# Patient Record
Sex: MALE | Race: WHITE | NOT HISPANIC OR LATINO | URBAN - METROPOLITAN AREA
[De-identification: names, ages, dates, MRNs, and addresses within clinical notes are randomized per-mention and may not be internally consistent; named-entity substitution may affect disease eponyms.]

---

## 2022-04-22 ENCOUNTER — EMERGENCY (EMERGENCY)
Facility: HOSPITAL | Age: 32
LOS: 1 days | Discharge: ANOTHER HOSPITAL | End: 2022-04-22
Attending: EMERGENCY MEDICINE | Admitting: EMERGENCY MEDICINE
Payer: COMMERCIAL

## 2022-04-22 ENCOUNTER — EMERGENCY (EMERGENCY)
Facility: HOSPITAL | Age: 32
LOS: 1 days | Discharge: HOME | End: 2022-04-22
Attending: EMERGENCY MEDICINE | Admitting: EMERGENCY MEDICINE
Payer: COMMERCIAL

## 2022-04-22 VITALS
DIASTOLIC BLOOD PRESSURE: 82 MMHG | RESPIRATION RATE: 17 BRPM | HEART RATE: 65 BPM | OXYGEN SATURATION: 99 % | SYSTOLIC BLOOD PRESSURE: 143 MMHG

## 2022-04-22 VITALS
OXYGEN SATURATION: 99 % | HEART RATE: 60 BPM | SYSTOLIC BLOOD PRESSURE: 152 MMHG | RESPIRATION RATE: 20 BRPM | DIASTOLIC BLOOD PRESSURE: 81 MMHG

## 2022-04-22 VITALS
HEART RATE: 91 BPM | OXYGEN SATURATION: 100 % | DIASTOLIC BLOOD PRESSURE: 85 MMHG | TEMPERATURE: 98 F | RESPIRATION RATE: 18 BRPM | SYSTOLIC BLOOD PRESSURE: 144 MMHG

## 2022-04-22 VITALS
HEART RATE: 66 BPM | HEIGHT: 76 IN | SYSTOLIC BLOOD PRESSURE: 138 MMHG | WEIGHT: 179.9 LBS | DIASTOLIC BLOOD PRESSURE: 86 MMHG | RESPIRATION RATE: 16 BRPM | TEMPERATURE: 98 F | OXYGEN SATURATION: 99 %

## 2022-04-22 DIAGNOSIS — J02.9 ACUTE PHARYNGITIS, UNSPECIFIED: ICD-10-CM

## 2022-04-22 DIAGNOSIS — Z20.822 CONTACT WITH AND (SUSPECTED) EXPOSURE TO COVID-19: ICD-10-CM

## 2022-04-22 DIAGNOSIS — J36 PERITONSILLAR ABSCESS: ICD-10-CM

## 2022-04-22 LAB
ALBUMIN SERPL ELPH-MCNC: 3.8 G/DL — SIGNIFICANT CHANGE UP (ref 3.4–5)
ALP SERPL-CCNC: 60 U/L — SIGNIFICANT CHANGE UP (ref 40–120)
ALT FLD-CCNC: 40 U/L — SIGNIFICANT CHANGE UP (ref 12–42)
ANION GAP SERPL CALC-SCNC: 7 MMOL/L — LOW (ref 9–16)
APPEARANCE UR: CLEAR — SIGNIFICANT CHANGE UP
APTT BLD: 27.9 SEC — SIGNIFICANT CHANGE UP (ref 27.5–35.5)
AST SERPL-CCNC: 14 U/L — LOW (ref 15–37)
BASOPHILS # BLD AUTO: 0.02 K/UL — SIGNIFICANT CHANGE UP (ref 0–0.2)
BASOPHILS NFR BLD AUTO: 0.1 % — SIGNIFICANT CHANGE UP (ref 0–2)
BILIRUB SERPL-MCNC: 0.5 MG/DL — SIGNIFICANT CHANGE UP (ref 0.2–1.2)
BILIRUB UR-MCNC: NEGATIVE — SIGNIFICANT CHANGE UP
BUN SERPL-MCNC: 12 MG/DL — SIGNIFICANT CHANGE UP (ref 7–23)
CALCIUM SERPL-MCNC: 9.3 MG/DL — SIGNIFICANT CHANGE UP (ref 8.5–10.5)
CHLORIDE SERPL-SCNC: 101 MMOL/L — SIGNIFICANT CHANGE UP (ref 96–108)
CO2 SERPL-SCNC: 30 MMOL/L — SIGNIFICANT CHANGE UP (ref 22–31)
COLOR SPEC: YELLOW — SIGNIFICANT CHANGE UP
CREAT SERPL-MCNC: 0.94 MG/DL — SIGNIFICANT CHANGE UP (ref 0.5–1.3)
DIFF PNL FLD: NEGATIVE — SIGNIFICANT CHANGE UP
EGFR: 110 ML/MIN/1.73M2 — SIGNIFICANT CHANGE UP
EOSINOPHIL # BLD AUTO: 0.02 K/UL — SIGNIFICANT CHANGE UP (ref 0–0.5)
EOSINOPHIL NFR BLD AUTO: 0.1 % — SIGNIFICANT CHANGE UP (ref 0–6)
GLUCOSE SERPL-MCNC: 93 MG/DL — SIGNIFICANT CHANGE UP (ref 70–99)
GLUCOSE UR QL: NEGATIVE — SIGNIFICANT CHANGE UP
GRAM STN FLD: SIGNIFICANT CHANGE UP
HCT VFR BLD CALC: 41.7 % — SIGNIFICANT CHANGE UP (ref 39–50)
HGB BLD-MCNC: 14 G/DL — SIGNIFICANT CHANGE UP (ref 13–17)
IMM GRANULOCYTES NFR BLD AUTO: 0.7 % — SIGNIFICANT CHANGE UP (ref 0–1.5)
INR BLD: 1.08 — SIGNIFICANT CHANGE UP (ref 0.88–1.16)
KETONES UR-MCNC: NEGATIVE — SIGNIFICANT CHANGE UP
LACTATE SERPL-SCNC: 1.4 MMOL/L — SIGNIFICANT CHANGE UP (ref 0.4–2)
LEUKOCYTE ESTERASE UR-ACNC: NEGATIVE — SIGNIFICANT CHANGE UP
LYMPHOCYTES # BLD AUTO: 1.26 K/UL — SIGNIFICANT CHANGE UP (ref 1–3.3)
LYMPHOCYTES # BLD AUTO: 8.3 % — LOW (ref 13–44)
MANUAL SMEAR VERIFICATION: SIGNIFICANT CHANGE UP
MCHC RBC-ENTMCNC: 30.1 PG — SIGNIFICANT CHANGE UP (ref 27–34)
MCHC RBC-ENTMCNC: 33.6 GM/DL — SIGNIFICANT CHANGE UP (ref 32–36)
MCV RBC AUTO: 89.7 FL — SIGNIFICANT CHANGE UP (ref 80–100)
MONOCYTES # BLD AUTO: 1.65 K/UL — HIGH (ref 0–0.9)
MONOCYTES NFR BLD AUTO: 10.8 % — SIGNIFICANT CHANGE UP (ref 2–14)
NEUTROPHILS # BLD AUTO: 12.19 K/UL — HIGH (ref 1.8–7.4)
NEUTROPHILS NFR BLD AUTO: 80 % — HIGH (ref 43–77)
NITRITE UR-MCNC: NEGATIVE — SIGNIFICANT CHANGE UP
NRBC # BLD: 0 /100 WBCS — SIGNIFICANT CHANGE UP (ref 0–0)
PH UR: 7 — SIGNIFICANT CHANGE UP (ref 5–8)
PLAT MORPH BLD: NORMAL — SIGNIFICANT CHANGE UP
PLATELET # BLD AUTO: 333 K/UL — SIGNIFICANT CHANGE UP (ref 150–400)
POTASSIUM SERPL-MCNC: 3.9 MMOL/L — SIGNIFICANT CHANGE UP (ref 3.5–5.3)
POTASSIUM SERPL-SCNC: 3.9 MMOL/L — SIGNIFICANT CHANGE UP (ref 3.5–5.3)
PROT SERPL-MCNC: 7.9 G/DL — SIGNIFICANT CHANGE UP (ref 6.4–8.2)
PROT UR-MCNC: NEGATIVE MG/DL — SIGNIFICANT CHANGE UP
PROTHROM AB SERPL-ACNC: 12.7 SEC — SIGNIFICANT CHANGE UP (ref 10.5–13.4)
RAPID RVP RESULT: SIGNIFICANT CHANGE UP
RBC # BLD: 4.65 M/UL — SIGNIFICANT CHANGE UP (ref 4.2–5.8)
RBC # FLD: 12.3 % — SIGNIFICANT CHANGE UP (ref 10.3–14.5)
RBC BLD AUTO: NORMAL — SIGNIFICANT CHANGE UP
S PYO AG SPEC QL IA: NEGATIVE — SIGNIFICANT CHANGE UP
SARS-COV-2 RNA SPEC QL NAA+PROBE: SIGNIFICANT CHANGE UP
SODIUM SERPL-SCNC: 138 MMOL/L — SIGNIFICANT CHANGE UP (ref 132–145)
SP GR SPEC: 1.01 — SIGNIFICANT CHANGE UP (ref 1–1.03)
SPECIMEN SOURCE: SIGNIFICANT CHANGE UP
UROBILINOGEN FLD QL: 0.2 E.U./DL — SIGNIFICANT CHANGE UP
WBC # BLD: 15.24 K/UL — HIGH (ref 3.8–10.5)
WBC # FLD AUTO: 15.24 K/UL — HIGH (ref 3.8–10.5)

## 2022-04-22 PROCEDURE — 99284 EMERGENCY DEPT VISIT MOD MDM: CPT | Mod: 25

## 2022-04-22 PROCEDURE — 99284 EMERGENCY DEPT VISIT MOD MDM: CPT

## 2022-04-22 PROCEDURE — 71046 X-RAY EXAM CHEST 2 VIEWS: CPT | Mod: 26

## 2022-04-22 PROCEDURE — 87070 CULTURE OTHR SPECIMN AEROBIC: CPT

## 2022-04-22 PROCEDURE — 42700 I&D ABSCESS PERITONSILLAR: CPT

## 2022-04-22 PROCEDURE — 70491 CT SOFT TISSUE NECK W/DYE: CPT | Mod: 26

## 2022-04-22 PROCEDURE — 93010 ELECTROCARDIOGRAM REPORT: CPT

## 2022-04-22 RX ORDER — DEXAMETHASONE 0.5 MG/5ML
10 ELIXIR ORAL ONCE
Refills: 0 | Status: COMPLETED | OUTPATIENT
Start: 2022-04-22 | End: 2022-04-22

## 2022-04-22 RX ORDER — PIPERACILLIN AND TAZOBACTAM 4; .5 G/20ML; G/20ML
3.38 INJECTION, POWDER, LYOPHILIZED, FOR SOLUTION INTRAVENOUS ONCE
Refills: 0 | Status: COMPLETED | OUTPATIENT
Start: 2022-04-22 | End: 2022-04-22

## 2022-04-22 RX ORDER — SODIUM CHLORIDE 9 MG/ML
2500 INJECTION INTRAMUSCULAR; INTRAVENOUS; SUBCUTANEOUS ONCE
Refills: 0 | Status: COMPLETED | OUTPATIENT
Start: 2022-04-22 | End: 2022-04-22

## 2022-04-22 RX ORDER — TETRACAINE/BENZOCAINE/BUTAMBEN 2%-14%-2%
1 OINTMENT (GRAM) TOPICAL ONCE
Refills: 0 | Status: COMPLETED | OUTPATIENT
Start: 2022-04-22 | End: 2022-04-22

## 2022-04-22 RX ORDER — KETOROLAC TROMETHAMINE 30 MG/ML
15 SYRINGE (ML) INJECTION ONCE
Refills: 0 | Status: DISCONTINUED | OUTPATIENT
Start: 2022-04-22 | End: 2022-04-22

## 2022-04-22 RX ADMIN — Medication 15 MILLIGRAM(S): at 12:14

## 2022-04-22 RX ADMIN — Medication 102 MILLIGRAM(S): at 11:42

## 2022-04-22 RX ADMIN — Medication 15 MILLIGRAM(S): at 11:42

## 2022-04-22 RX ADMIN — PIPERACILLIN AND TAZOBACTAM 200 GRAM(S): 4; .5 INJECTION, POWDER, LYOPHILIZED, FOR SOLUTION INTRAVENOUS at 11:42

## 2022-04-22 RX ADMIN — SODIUM CHLORIDE 2500 MILLILITER(S): 9 INJECTION INTRAMUSCULAR; INTRAVENOUS; SUBCUTANEOUS at 11:42

## 2022-04-22 RX ADMIN — Medication 1 SPRAY(S): at 15:41

## 2022-04-22 NOTE — ED ADULT TRIAGE NOTE - CHIEF COMPLAINT QUOTE
transfer from [facility] for peritonsillar abscess. pt denies sob, n/v, currently pain is under control and afebrile

## 2022-04-22 NOTE — PROCEDURE NOTE - NSFINDINGS_GEN_A_CORE
Attending MD Toussaint.  Pt seen and examined by me on morning of 4/22 in CDU obs unit.  Pt is a 78 yo male with pmhx of PTSD, GERD, prediabetes, recovering alcoholic/opiate addict, EMI, SBP who presented to ED with complaint of R knee swelling, redness x 2 wks.  Sxs have worsened and presented with inc erythema, warmth.  XR in ED c/w arthritis of knee.  Sent to CDU for cellulitis receiving anceph.  Sxs have not worsened.  Lyme titers sent this morning.  Pt has preserved ROM of knee, improving since arrival.  Pt has received 2 doses anceph.  Planned next dose currently and probable transition to PO abxs after this morning's dose.  Planned referral for endocrine f/u 2/2 new DM dx (Hgb A1c 6.5).
3 cc/purulent drainage

## 2022-04-22 NOTE — ED PROVIDER NOTE - PROGRESS NOTE DETAILS
d/w with ENT resident Kaiden who agrees with ED to ED transfer for ENT evaluation. requests repeat CT  prior to transfer.

## 2022-04-22 NOTE — ED PROVIDER NOTE - CARE PROVIDER_API CALL
Meek James)  Otolaryngology  [address], [address], [place] [address]  Phone: [phone]  Fax: [phone]  Follow Up Time:

## 2022-04-22 NOTE — ED PROVIDER NOTE - NS ED ATTENDING STATEMENT MOD
This was a shared visit with the JAYLON. I reviewed and verified the documentation and independently performed the documented:

## 2022-04-22 NOTE — ED PROVIDER NOTE - CARE PROVIDERS DIRECT ADDRESSES
[email or web address]

## 2022-04-22 NOTE — ED PROVIDER NOTE - PHYSICAL EXAMINATION
VITAL SIGNS: I have reviewed nursing notes and confirm.  CONSTITUTIONAL: Well appearing, in no acute distress.   SKIN:  warm and dry, no acute rash.   HEAD:  normocephalic, atraumatic.  EYES: EOM intact; conjunctiva and sclera clear.  ENT: No nasal discharge; airway clear. Erythema to left tonsil. Left peritonsillar abscess. Uvula deviated to the right.   NECK: Supple; non tender.  CARD: S1, S2 normal; no murmurs, gallops, or rubs. Regular rate and rhythm.   RESP:  Clear to auscultation b/l, no wheezes, rales or rhonchi.  ABD: Normal bowel sounds; soft; non-distended; non-tender; no guarding/ rebound.  EXT: Normal ROM. No clubbing, cyanosis or edema. 2+ pulses to b/l ue/le.  NEURO: Alert, oriented, grossly unremarkable  PSYCH: Cooperative, mood and affect appropriate. VITAL SIGNS: I have reviewed nursing notes and confirm.  CONSTITUTIONAL: Well appearing, in no acute distress. + muffled voice.  SKIN:  warm and dry, no acute rash.   HEAD:  normocephalic, atraumatic.  EYES: EOM intact; conjunctiva and sclera clear.  ENT: No nasal discharge; airway clear. + Left peritonsillar abscess with erythema, Uvula deviated to the right.   NECK: Supple; non tender.  CARD: S1, S2 normal; no murmurs, gallops, or rubs. Regular rate and rhythm.   RESP:  Clear to auscultation b/l, no wheezes, rales or rhonchi.  ABD: Normal bowel sounds; soft; non-distended; non-tender; no guarding/ rebound.  EXT: Normal ROM. No clubbing, cyanosis or edema. 2+ pulses to b/l ue/le.  NEURO: Alert, oriented, grossly unremarkable  PSYCH: Cooperative, mood and affect appropriate.

## 2022-04-22 NOTE — ED PROVIDER NOTE - OBJECTIVE STATEMENT
33 y/o M transferred from [facility] for left peritonsillar abscess noted on CT neck today presents to ED with c/o sore throat that began 8 days ago. Pain is left sided and has progressively worsened. Pt also with c/o difficulty swallowing and difficulty speaking. Denies any SOB, chest pain, abdominal pain, fevers, or chills. Pt was initially started on amoxicillin and steroids with transient improvement. Pt was seen by ENT 3 days ago. I&D was performed with small amount of drainage. Antibiotics were changed to clindamycin and pt started on medrol dose pack with transient improvement. Pt presented to [facility] due to worsening pain. While at [facility] pt was treated with zosyn, toradol, and dexamethasone. 33 y/o M transferred from [facility] for left peritonsillar abscess noted on CT neck today. + left sided sore throat that began 8 days ago and has gotten progressively worse. Pt c/o difficulty swallowing and difficulty speaking. Denies any SOB, chest pain, abdominal pain, fevers, or chills. Pt was initially started on amoxicillin and steroids with transient improvement. Pt was seen by ENT 3 days ago and I&D performed with small amount of drainage. Antibiotics were changed to clindamycin and pt started on medrol dose pack with transient improvement. Pt presented to [facility] today due to worsening pain. While at [facility] pt was treated with zosyn, toradol, and dexamethasone.

## 2022-04-22 NOTE — ED PROVIDER NOTE - CLINICAL SUMMARY MEDICAL DECISION MAKING FREE TEXT BOX
pt presents with left PTA. failed outpatient abx x 2 (amoxicillin and clindamycin) and steroids and prior I&D. difficult phonation but protecting airway. labs with leukocytosis. will repeat CT at ENT request. will transfer for ENT consult.

## 2022-04-22 NOTE — ED PROVIDER NOTE - ENMT, MLM
Airway patent, Nasal mucosa clear. Mouth with normal mucosa. throat with left sided tonsilar edema and exudates, erythematous, uvula deviated to the right, voice muffled

## 2022-04-22 NOTE — ED ADULT NURSE NOTE - CHIEF COMPLAINT QUOTE
pt dx left pta on 4/14/22 presents for increased pain and difficulty swallowing/clearing secretions; o2 sat 100 on room air, afebrile

## 2022-04-22 NOTE — ED ADULT NURSE NOTE - OBJECTIVE STATEMENT
pt presents to ed for peritonsillar abscess from stand alone clinic. pt states he has been having throat pain for 8 days. states he mainly has pain when he swallows. pt was on abt for the abscess but states it is not getting better. denies any trouble breathing.

## 2022-04-22 NOTE — CONSULT NOTE ADULT - ATTENDING COMMENTS
I agree with hx, exam, as outlined.  I also agree with treatment plan and this was discussed in detail.    Pt doing well post I and D.  Will follow up with me 1 week    Meek James

## 2022-04-22 NOTE — CONSULT NOTE ADULT - SUBJECTIVE AND OBJECTIVE BOX
ENT Consult Note    HPI:      PAST MEDICAL & SURGICAL HISTORY:  No pertinent past medical history        MEDICATIONS  (STANDING):    MEDICATIONS  (PRN):      Social History:      Vital Signs Last 24 Hrs  T(C): 36.8 (22 Apr 2022 14:55), Max: 36.8 (22 Apr 2022 14:55)  T(F): 98.2 (22 Apr 2022 14:55), Max: 98.2 (22 Apr 2022 14:55)  HR: 66 (22 Apr 2022 14:55) (60 - 91)  BP: 138/86 (22 Apr 2022 14:55) (129/73 - 152/81)  BP(mean): --  RR: 16 (22 Apr 2022 14:55) (16 - 20)  SpO2: 99% (22 Apr 2022 14:55) (98% - 100%)    Physical Exam:                                14.0   15.24 )-----------( 333      ( 22 Apr 2022 11:20 )             41.7       04-22    138  |  101  |  12  ----------------------------<  93  3.9   |  30  |  0.94    Ca    9.3      22 Apr 2022 11:20    TPro  7.9  /  Alb  3.8  /  TBili  0.5  /  DBili  x   /  AST  14<L>  /  ALT  40  /  AlkPhos  60  04-22      PT/INR - ( 22 Apr 2022 11:20 )   PT: 12.7 sec;   INR: 1.08          PTT - ( 22 Apr 2022 11:20 )  PTT:27.9 sec       ENT Consult Note    HPI: 32M no PMH presenting with sore throat starting 10 days ago. 1 week ago woke up with significantly more pain and went to PCP who prescribed amoxicillin and sterois. Pt had minimal improvement but symptoms began to worsen by day 3 of taking abx. Pt went to ENT to attempted I&D and switched antibiotic to clindamycin and gave medrol. Pat again experienced minimal relief and symptoms began to worsen. He then presented to [facility]. CT showed a left peritonsillar abscess. He was given zosyn and decadron in the ED. He was transferred to [facility] ED for ENT consult. He has not had any fevers, has been tolerating liquid diet. Has never had a PTA before.       PAST MEDICAL & SURGICAL HISTORY:  No pertinent past medical history        MEDICATIONS  (STANDING):    MEDICATIONS  (PRN):      Social History:      Vital Signs Last 24 Hrs  T(C): 36.8 (22 Apr 2022 14:55), Max: 36.8 (22 Apr 2022 14:55)  T(F): 98.2 (22 Apr 2022 14:55), Max: 98.2 (22 Apr 2022 14:55)  HR: 66 (22 Apr 2022 14:55) (60 - 91)  BP: 138/86 (22 Apr 2022 14:55) (129/73 - 152/81)  BP(mean): --  RR: 16 (22 Apr 2022 14:55) (16 - 20)  SpO2: 99% (22 Apr 2022 14:55) (98% - 100%)    Physical Exam:  Gen: NAD  OC/OP: left peritonsillar swelling, tonsillar exudate, mild trismus, uvula deviated to the right. Floor of mouth soft  Resp: Breathing comfortably on RA, muffled voice                          14.0   15.24 )-----------( 333      ( 22 Apr 2022 11:20 )             41.7       04-22    138  |  101  |  12  ----------------------------<  93  3.9   |  30  |  0.94    Ca    9.3      22 Apr 2022 11:20    TPro  7.9  /  Alb  3.8  /  TBili  0.5  /  DBili  x   /  AST  14<L>  /  ALT  40  /  AlkPhos  60  04-22      PT/INR - ( 22 Apr 2022 11:20 )   PT: 12.7 sec;   INR: 1.08          PTT - ( 22 Apr 2022 11:20 )  PTT:27.9 sec    See procedure note for I&D

## 2022-04-22 NOTE — ED PROVIDER NOTE - ATTENDING APP SHARED VISIT CONTRIBUTION OF CARE
left PTA confirmed by imaging yesterday, presents for worsening pain, dysphonia since being seen by ENT at [facility] yesterday. Pt has been on both augmentin/clindamycin.    sats wnl, no stridor/tripodding/drooling. + hot potato voice.    plan: consult ENT [facility] .

## 2022-04-22 NOTE — ED ADULT NURSE NOTE - OBJECTIVE STATEMENT
Pt presents to ED complaining of dx'd PTA to L side that has been unresponsive to PO abx treatment. Pt endorses seeing GP, ENT, and rcving CT neck yesterday at [facility]. Pt unable to speak, tolerate solid foods, or fully open mouth. Pt appears well perfused. Skin intact with pruritic rash to medial aspect of thighs and inner arms sp abx treatment.  Afebrile at time of assessment denies NVD, chills.

## 2022-04-22 NOTE — ED PROVIDER NOTE - SKIN, MLM
Skin normal color for race, warm, dry and intact. + erythematous maculopapular rash to bilat arms and chest

## 2022-04-22 NOTE — CONSULT NOTE ADULT - ASSESSMENT
32M with 10 days of throat pain, failed course of antibiotics, presenting with peritonsillar abscess. S/p bedside I&D (See procedure note for details).    - f/u cultures  - augmentin x1 week  - medrol dosepack  - Follow up with Dr. James in 1 week

## 2022-04-22 NOTE — ED ADULT NURSE NOTE - SUICIDE SCREENING QUESTION 3
Received a call from Paz Fan RN from [facility], who wanted an update for accepting pulmonologist Dr. Daiana Jimenez. Confirmed that patient is not awaiting beds from other hospitals. No bed available at [facility] at this time.    No

## 2022-04-22 NOTE — ED PROVIDER NOTE - PATIENT PORTAL LINK FT
You can access the FollowMyHealth Patient Portal offered by [facility] by registering at the following website: [email or web address]. By joining [email or web address] FollowMyHealth portal, you will also be able to view your health information using other applications (apps) compatible with our system.

## 2022-04-22 NOTE — ED PROVIDER NOTE - OBJECTIVE STATEMENT
31yo otherwise healthy M presents with c/o left sided sore throat x7-10 days. pt seen by PMD who prescribed amoxicillin and steroids which provided some relief x 3 days but then symptoms worsened so he was referred to ENT who he saw 3 days ago and had attempted I&D without much improvement. was switched to clindamycin and medrol dose pack which he has been taking x2 days without relief. had CT neck done yesterday which 3x3cm phlegmon. also notes developed a rash. denies fever, chills, sob, cp, dizziness. notes pain with swallowing and with speaking. had negative strep and negative culture, no mono done. significantly muffled voice so girlfriend assisted with history.

## 2022-04-22 NOTE — ED PROVIDER NOTE - CLINICAL SUMMARY MEDICAL DECISION MAKING FREE TEXT BOX
Impression: left sided PTA confirmed on CT maxillofacial. ENT consulted for I&D of abscess. Impression: left sided PTA confirmed on CT neck at [facility], here for ent evaluation. AVSS. Pt seen by ENT and i&d performed with drainage of pus. Sx's improved afterwards and pt speaking more clearly. Pt cleared for dc home with outpt f/u with Dr. James in 1 wk. Will change abx to augmentin and given new rx for medrol dose pack. ED evaluation and management discussed with the patient in detail. Strict ED return instructions discussed in detail and patient given the opportunity to ask any questions about their discharge diagnosis and instructions. Patient verbalized understanding.

## 2022-04-22 NOTE — ED PROVIDER NOTE - NSFOLLOWUPINSTRUCTIONS_ED_ALL_ED_FT
Take augmentin twice daily for the following week.  Also take medrol dose pack starting tomorrow for 1 week.  Drink plenty of fluids, take advil/ motrin as needed for pain.  Follow up with Dr. James next Thursday/ Friday for re-evaluation.  Return to er for any new or worsening symptoms (fever, chills, increased pain, difficulty breathing, inability to swallow secretions...).      EnglishSpanish                                                                         Peritonsillar Abscess    An open mouth showing the tonsils.   A peritonsillar abscess is an infected area in your throat that is filled with pus. It forms behind your tonsils. This may be treated by:  •Draining the pus. Your doctor may do this with a syringe and a needle (needle aspiration) or by making a cut in the abscess.      •Using antibiotic medicine.        Follow these instructions at home:    Medicines     •Take over-the-counter and prescription medicines only as told by your doctor.      •If you were prescribed an antibiotic, take it as told by your doctor. Do not stop taking the antibiotic even if you start to feel better.        Eating and drinking   A diet of soft foods, including apple sauce, yogurt, and smoothie.    •Drink enough fluid to keep your pee (urine) pale yellow.    •While your throat is sore, try one of these:  •Only drinking liquids.      •Eating only soft foods, such as yogurt and ice cream.        General instructions     •Rest as much as you can. Get plenty of sleep.      •Return to your normal activities as told by your doctor. Ask your doctor what activities are safe for you.    •If your abscess was drained, gargle with a salt-water mixture 3–4 times a day or as needed.  • To make a salt-water mixture, completely dissolve ½–1 tsp of salt in 1 cup of warm water.       •Do not swallow this mixture.        • Do not use any products that have nicotine or tobacco in them. These include cigarettes and e-cigarettes. If you need help quitting, ask your doctor.      •Keep all follow-up visits as told by your doctor. This is important.        Contact a doctor if you have:    •More pain, swelling, redness, or pus in your throat.      •A headache.      •Low energy (lethargy).      •A general feeling of illness (malaise).      •A fever.      •Dizziness.      •Trouble swallowing.      •Trouble eating.    •Signs of body fluid loss (dehydration), such as:  •Feeling light-headed when you are standing.      •Peeing (urinating) less than usual.      •A fast heart rate.      •Dry mouth.          Get help right away if you:    •Have trouble talking.      •Have trouble breathing.      •Breathing is easier when you lean forward.      •Cough up blood.      •Throw up (vomit) blood.      •Have very bad throat pain and it does not get better with medicine.        Summary    •A peritonsillar abscess is an infected area in your throat that is filled with pus.      •You may be treated by having the abscess drained and by taking antibiotic medicine.      •Contact a doctor if you have trouble swallowing or eating.      •Get help right away if you cough up blood or see blood when you throw up (vomit).      This information is not intended to replace advice given to you by your health care provider. Make sure you discuss any questions you have with your health care provider.      Document Revised: 11/18/2021 Document Reviewed: 11/18/2021    Elsevier Patient Education © 2022 [employer] Inc.

## 2022-04-23 DIAGNOSIS — J35.8 OTHER CHRONIC DISEASES OF TONSILS AND ADENOIDS: ICD-10-CM

## 2022-04-23 DIAGNOSIS — D72.829 ELEVATED WHITE BLOOD CELL COUNT, UNSPECIFIED: ICD-10-CM

## 2022-04-23 DIAGNOSIS — J02.9 ACUTE PHARYNGITIS, UNSPECIFIED: ICD-10-CM

## 2022-04-23 DIAGNOSIS — Z20.822 CONTACT WITH AND (SUSPECTED) EXPOSURE TO COVID-19: ICD-10-CM

## 2022-04-23 DIAGNOSIS — R00.0 TACHYCARDIA, UNSPECIFIED: ICD-10-CM

## 2022-04-24 LAB
CULTURE RESULTS: NO GROWTH — SIGNIFICANT CHANGE UP
CULTURE RESULTS: SIGNIFICANT CHANGE UP
SPECIMEN SOURCE: SIGNIFICANT CHANGE UP
SPECIMEN SOURCE: SIGNIFICANT CHANGE UP

## 2022-04-25 PROBLEM — Z78.9 OTHER SPECIFIED HEALTH STATUS: Chronic | Status: ACTIVE | Noted: 2022-04-22

## 2022-04-25 LAB
CULTURE RESULTS: SIGNIFICANT CHANGE UP
HETEROPH AB TITR SER AGGL: NEGATIVE — SIGNIFICANT CHANGE UP
SPECIMEN SOURCE: SIGNIFICANT CHANGE UP

## 2022-04-26 PROBLEM — Z00.00 ENCOUNTER FOR PREVENTIVE HEALTH EXAMINATION: Status: ACTIVE | Noted: 2022-04-26

## 2022-04-27 LAB
CULTURE RESULTS: SIGNIFICANT CHANGE UP
CULTURE RESULTS: SIGNIFICANT CHANGE UP
SPECIMEN SOURCE: SIGNIFICANT CHANGE UP
SPECIMEN SOURCE: SIGNIFICANT CHANGE UP

## 2022-04-28 ENCOUNTER — APPOINTMENT (OUTPATIENT)
Dept: OTOLARYNGOLOGY | Facility: CLINIC | Age: 32
End: 2022-04-28
Payer: COMMERCIAL

## 2022-04-28 ENCOUNTER — NON-APPOINTMENT (OUTPATIENT)
Age: 32
End: 2022-04-28

## 2022-04-28 VITALS
BODY MASS INDEX: 21.92 KG/M2 | WEIGHT: 180 LBS | TEMPERATURE: 97.7 F | HEART RATE: 82 BPM | SYSTOLIC BLOOD PRESSURE: 142 MMHG | DIASTOLIC BLOOD PRESSURE: 80 MMHG | OXYGEN SATURATION: 97 % | HEIGHT: 76 IN

## 2022-04-28 PROCEDURE — 99024 POSTOP FOLLOW-UP VISIT: CPT

## 2022-04-28 NOTE — ASSESSMENT
[FreeTextEntry1] : 32 year old male presents as follow up from the [facility] for peritonsillar abscess s/p incision & drainage. His symptoms have improved significantly. Today, he does not complain of any symptoms. On exam, the left tonsil is healing well. Based on his history and exam findings, we did discuss possible tonsillectomy in the next 6 months. He will follow up with us at that time, or sooner if needed.  \par \par Plan:\par - plan for tonsillectomy in the next 6 months\par - fu as needed

## 2022-04-28 NOTE — PHYSICAL EXAM
[Normal] : mucosa is normal [Midline] : trachea located in midline position [de-identified] : left anterior tonsil pillar healing well s/p I&D

## 2022-04-28 NOTE — DATA REVIEWED
[de-identified] : PTA culture from recent emergency room visit:\par \par Rare Streptococcus mitis/oralis group\par Rare Rothia mucilaginosa\par Rare Actinomyces odontolyticus\par Rare Granulicatella adiacens\par Rare Neisseria flavescens\par Rare Haemophilus parainfluenzae\par Culture grew 3 or more types of organisms which indicate collection\par contamination; consider recollection only if clinically indicated.

## 2022-05-04 ENCOUNTER — APPOINTMENT (OUTPATIENT)
Dept: OTOLARYNGOLOGY | Facility: CLINIC | Age: 32
End: 2022-05-04
Payer: COMMERCIAL

## 2022-05-04 VITALS
DIASTOLIC BLOOD PRESSURE: 92 MMHG | OXYGEN SATURATION: 99 % | WEIGHT: 180 LBS | HEART RATE: 79 BPM | HEIGHT: 76 IN | TEMPERATURE: 97.9 F | SYSTOLIC BLOOD PRESSURE: 141 MMHG | BODY MASS INDEX: 21.92 KG/M2

## 2022-05-04 DIAGNOSIS — J03.90 ACUTE TONSILLITIS, UNSPECIFIED: ICD-10-CM

## 2022-05-04 DIAGNOSIS — Z78.9 OTHER SPECIFIED HEALTH STATUS: ICD-10-CM

## 2022-05-04 DIAGNOSIS — Z87.09 PERSONAL HISTORY OF OTHER DISEASES OF THE RESPIRATORY SYSTEM: ICD-10-CM

## 2022-05-04 DIAGNOSIS — J36 PERITONSILLAR ABSCESS: ICD-10-CM

## 2022-05-04 DIAGNOSIS — Z72.89 OTHER PROBLEMS RELATED TO LIFESTYLE: ICD-10-CM

## 2022-05-04 PROCEDURE — 99213 OFFICE O/P EST LOW 20 MIN: CPT

## 2022-05-04 RX ORDER — IBUPROFEN 800 MG/1
800 TABLET, FILM COATED ORAL 3 TIMES DAILY
Qty: 1 | Refills: 0 | Status: ACTIVE | COMMUNITY
Start: 2022-05-04 | End: 1900-01-01

## 2022-05-04 RX ORDER — AMOXICILLIN AND CLAVULANATE POTASSIUM 875; 125 MG/1; MG/1
875-125 TABLET, COATED ORAL
Refills: 0 | Status: ACTIVE | COMMUNITY

## 2022-05-04 RX ORDER — AMOXICILLIN AND CLAVULANATE POTASSIUM 875; 125 MG/1; MG/1
875-125 TABLET, COATED ORAL
Qty: 14 | Refills: 0 | Status: ACTIVE | COMMUNITY
Start: 2022-05-04 | End: 1900-01-01

## 2022-05-11 ENCOUNTER — APPOINTMENT (OUTPATIENT)
Dept: OTOLARYNGOLOGY | Facility: CLINIC | Age: 32
End: 2022-05-11
Payer: COMMERCIAL

## 2022-05-11 VITALS
WEIGHT: 180 LBS | TEMPERATURE: 98 F | SYSTOLIC BLOOD PRESSURE: 150 MMHG | HEART RATE: 76 BPM | BODY MASS INDEX: 21.92 KG/M2 | OXYGEN SATURATION: 100 % | DIASTOLIC BLOOD PRESSURE: 91 MMHG | RESPIRATION RATE: 20 BRPM | HEIGHT: 76 IN

## 2022-05-11 DIAGNOSIS — J35.1 HYPERTROPHY OF TONSILS: ICD-10-CM

## 2022-05-11 DIAGNOSIS — R07.0 PAIN IN THROAT: ICD-10-CM

## 2022-05-11 DIAGNOSIS — R49.9 UNSPECIFIED VOICE AND RESONANCE DISORDER: ICD-10-CM

## 2022-05-11 DIAGNOSIS — J36 PERITONSILLAR ABSCESS: ICD-10-CM

## 2022-05-11 PROCEDURE — 99213 OFFICE O/P EST LOW 20 MIN: CPT

## 2022-05-11 RX ORDER — AMOXICILLIN AND CLAVULANATE POTASSIUM 875; 125 MG/1; MG/1
875-125 TABLET, COATED ORAL
Qty: 14 | Refills: 0 | Status: ACTIVE | COMMUNITY
Start: 2022-05-11 | End: 1900-01-01

## 2022-05-11 NOTE — REASON FOR VISIT
[Subsequent Evaluation] : a subsequent evaluation for [FreeTextEntry2] : Persistent left side peritonsillar abscess for the past 2 weeks.  Patient states his level of severity is a level 10 out of 10 and it occurs constant.  Patient states nothing helps to improve or worsens his Persistent left side peritonsillar abscess for the past 2 weeks.

## 2022-05-11 NOTE — HISTORY OF PRESENT ILLNESS
[de-identified] : 32 years old male patient with history of Persistent left side peritonsillar abscess for the past 2 weeks.   Patient is present today in the office with resolving Left Peritonsillar Abscess.  Bilateral Tonsil Hypertrophy > Left tonsil Hypertrophy

## 2022-05-24 NOTE — PHYSICAL EXAM
[Normal] : no abnormal secretions [de-identified] : persistent swelling Lt tonsil no PTA cont w ABx

## 2022-05-24 NOTE — CONSULT LETTER
[Dear  ___] : Dear  [unfilled], [Consult Letter:] : I had the pleasure of evaluating your patient, [unfilled]. [Please see my note below.] : Please see my note below. [Consult Closing:] : Thank you very much for allowing me to participate in the care of this patient.  If you have any questions, please do not hesitate to contact me. [Sincerely,] : Sincerely, [FreeTextEntry3] : Theodore Rojas MD, FACS\par Professor of Otolaryngology, [facility] [facility] [employer] at [facility]\par Director, [facility], Department of Otolaryngology, [facility]\par , Head & Neck Service Line, [facility]\par

## 2022-05-24 NOTE — HISTORY OF PRESENT ILLNESS
[de-identified] : 32 years old male patient with history of Persistent left side peritonsillar abscess for the past 2 weeks.   Patient is present today in the office with resolving left Peritonsillar Abscess

## 2022-11-01 ENCOUNTER — APPOINTMENT (OUTPATIENT)
Dept: OTOLARYNGOLOGY | Facility: CLINIC | Age: 32
End: 2022-11-01

## 2023-02-17 NOTE — ED ADULT NURSE NOTE - CHIEF COMPLAINT QUOTE
transfer from [facility] for peritonsillar abscess. pt denies sob, n/v, currently pain is under control and afebrile Benzoyl Peroxide Counseling: Patient counseled that medicine may cause skin irritation and bleach clothing.  In the event of skin irritation, the patient was advised to reduce the amount of the drug applied or use it less frequently.   The patient verbalized understanding of the proper use and possible adverse effects of benzoyl peroxide.  All of the patient's questions and concerns were addressed.

## 2025-01-24 ENCOUNTER — NON-APPOINTMENT (OUTPATIENT)
Age: 35
End: 2025-01-24

## 2025-01-27 ENCOUNTER — APPOINTMENT (OUTPATIENT)
Dept: ORTHOPEDIC SURGERY | Facility: CLINIC | Age: 35
End: 2025-01-27
Payer: COMMERCIAL

## 2025-01-27 VITALS — WEIGHT: 185 LBS | HEIGHT: 76 IN | BODY MASS INDEX: 22.53 KG/M2 | RESPIRATION RATE: 16 BRPM

## 2025-01-27 DIAGNOSIS — S62.322D DISPLACED FRACTURE OF SHAFT OF THIRD METACARPAL BONE, RIGHT HAND, SUBSEQUENT ENCOUNTER FOR FRACTURE WITH ROUTINE HEALING: ICD-10-CM

## 2025-01-27 DIAGNOSIS — S62.324D DISPLACED FRACTURE OF SHAFT OF FOURTH METACARPAL BONE, RIGHT HAND, SUBSEQUENT ENCOUNTER FOR FRACTURE WITH ROUTINE HEALING: ICD-10-CM

## 2025-01-27 PROCEDURE — 73130 X-RAY EXAM OF HAND: CPT | Mod: RT

## 2025-01-27 PROCEDURE — 99204 OFFICE O/P NEW MOD 45 MIN: CPT | Mod: 57

## 2025-02-03 ENCOUNTER — TRANSCRIPTION ENCOUNTER (OUTPATIENT)
Age: 35
End: 2025-02-03

## 2025-02-03 RX ORDER — ANTISEPTIC SURGICAL SCRUB 0.04 MG/ML
1 SOLUTION TOPICAL DAILY
Refills: 0 | Status: DISCONTINUED | OUTPATIENT
Start: 2025-02-04 | End: 2025-02-04

## 2025-02-03 NOTE — ASU DISCHARGE PLAN (ADULT/PEDIATRIC) - PROCEDURE
ORIF of Right third and fourth shaft fractures ORIF of Right third and fourth metacarpal shaft fractures

## 2025-02-03 NOTE — ASU PATIENT PROFILE, ADULT - AS SC BRADEN ACTIVITY
INR at goal. Medications and chart reviewed. No changes noted to necessitate adjustment of warfarin or follow-up plan. See calendar.      (4) walks frequently

## 2025-02-03 NOTE — ASU DISCHARGE PLAN (ADULT/PEDIATRIC) - NS MD DC FALL RISK RISK
For information on Fall & Injury Prevention, visit: [email or web address] OR  [email or web address] OR  https://www.cdc.gov/steadi/patient.html

## 2025-02-03 NOTE — BRIEF OPERATIVE NOTE - NSICDXBRIEFPROCEDURE_GEN_ALL_CORE_FT
PROCEDURES:  ORIF, fracture, metacarpal 03-Feb-2025 10:18:49 left third and fourth shaft fractures Maddy Haney A   PROCEDURES:  ORIF, fracture, metacarpal 03-Feb-2025 10:18:49 right third and fourth shaft fractures Maddy Haney A

## 2025-02-03 NOTE — BRIEF OPERATIVE NOTE - NSICDXBRIEFPREOP_GEN_ALL_CORE_FT
PRE-OP DIAGNOSIS:  Fracture of shaft of fourth metacarpal bone of left hand 03-Feb-2025 10:19:16  Maddy Haney  Fracture of shaft of third metacarpal bone of left hand 03-Feb-2025 10:19:29  Maddy Haney   PRE-OP DIAGNOSIS:  Fracture of shaft of third metacarpal bone of right hand 03-Feb-2025 10:22:17  Maddy Haney  Fracture of shaft of fourth metacarpal bone of right hand 03-Feb-2025 10:22:26  Maddy Haney

## 2025-02-03 NOTE — ASU DISCHARGE PLAN (ADULT/PEDIATRIC) - FINANCIAL ASSISTANCE
[facility] provides services at a reduced cost to those who are determined to be eligible through [facility]’s financial assistance program. Information regarding [facility]’s financial assistance program can be found by going to [email or web address] or by calling [phone].

## 2025-02-03 NOTE — ASU PATIENT PROFILE, ADULT - FALL HARM RISK - RISK INTERVENTIONS

## 2025-02-03 NOTE — ASU DISCHARGE PLAN (ADULT/PEDIATRIC) - CARE PROVIDER_API CALL
Ovi Ni  Surgery of the Hand  [address]  [place] [address]  Phone: [phone]  Fax: [phone]  Follow Up Time:

## 2025-02-03 NOTE — ASU DISCHARGE PLAN (ADULT/PEDIATRIC) - ASU DC SPECIAL INSTRUCTIONSFT
Co-Directors: Ovi Ni MD; Feng Robles MD; Sabino Kruse MD   [facility] [address] Floor 	  [place] [address] 	 	  Phone [phone] (HAND), Fax [phone]    [email or web address]    Hand Surgery Post Operative Instructions      DRESSING CARE:  1.	Please keep bandage ON and DRY until you return to the office for your 1st postoperative visit.   2.	In the shower you must cover bandage with a plastic bag. You can use tape or a rubber band so no water leaks into the bag.   3.	Please do not exercise as that leads to excessive sweating as the bandage will therefore become moist/ wet.    4.	Do not remove or change your bandage. You may apply more tape if dressing starts to unravel.   5.	Please do not insert any objects, such as a pencil, down into the bandage.     ELEVATION:  1.	Keep hand/wrist above heart level at all times or until bandage feels loose. This will help with swelling of the fingers and can be accomplished by using the FOAM PILLOW.   2.	 A sling will not hold your hand/wrist above your heart and therefore its use should be limited (it may also cause shoulder stiffness).     ACTIVITY:  1.	Moving your fingers daily after surgery is very important to prevent stiffness. Please open your fingers completely and close your fingers completely to achieve full range of motion.  **UNLESS TENDON REPAIR OR NERVE REPAIR SURGERY PERFORMED    2.	Move all joints of the extremity that are not immobilized to prevent stiffness (i.e. shoulder, elbow, fingers, and thumb unless instructed otherwise).   3.	Avoid activities which may re-injure your hand or finger.     DIET:   Regular diet. Start light and progress as tolerated.     PAIN MEDICINE:   1.	Pain medicine was sent to your pharmacy.  2.	Take pain medicine on an “AS NEEDED” basis according to your doctor’s instructions.   3.	Your pain will decrease over the next few days allowing you to:   •	Decrease your pain medicine quantity until you stop.   •	Increase the time between doses until you stop.   4.	You should not drink alcoholic beverages while on pain medication.   5.	Take pain medicine with food to prevent nausea.   6.	Constipation can occur. If no bowel movement occurs within 48 hours take a laxative of your choice (over the counter).	 	      CONTACT PHYSICIAN FOR:   Slight pain, swelling and bluish discoloration are to be expected. If you have breathing difficulty or chest pain dial 911 immediately. However, if the following symptoms occur notify your physician:   •	Temperature above 101° F 	        • Inability to urinate in 8 hours   •	Uncontrolled nausea/vomiting   	• Progressively increasing pain   •	Signs of wound infection 	                • Excessive bleeding  (Redness, swelling, pus-like drainage)     • Increasing numbness   •	Excessive swelling and tightness 	• Splint or cast that is too tight      OFFICE APPOINTMENT:    A staff member from the office will call you in the next 1-2 days to schedule your 1st Post Operative appointment to see your physician back in the office. *IF someone does not reach out to you in the next 1-2 days please call the office.      Patient Name _________________________________ Signature ______________________________ Date__________    Witness _____________________________________________________ Date ______________

## 2025-02-03 NOTE — BRIEF OPERATIVE NOTE - NSICDXBRIEFPOSTOP_GEN_ALL_CORE_FT
POST-OP DIAGNOSIS:  Fracture of shaft of fourth metacarpal bone of left hand 03-Feb-2025 10:20:39  Maddy Haney  Fracture of shaft of third metacarpal bone of left hand 03-Feb-2025 10:20:52  Maddy Haney   POST-OP DIAGNOSIS:  Fracture of shaft of third metacarpal bone of right hand 03-Feb-2025 10:19:48  Maddy Haney  Fracture of shaft of fourth metacarpal bone of right hand 03-Feb-2025 10:20:01  Maddy Haney

## 2025-02-04 ENCOUNTER — OUTPATIENT (OUTPATIENT)
Dept: OUTPATIENT SERVICES | Facility: HOSPITAL | Age: 35
LOS: 1 days | Discharge: HOME | End: 2025-02-04

## 2025-02-04 ENCOUNTER — TRANSCRIPTION ENCOUNTER (OUTPATIENT)
Age: 35
End: 2025-02-04

## 2025-02-04 ENCOUNTER — APPOINTMENT (OUTPATIENT)
Dept: ORTHOPEDIC SURGERY | Facility: AMBULATORY SURGERY CENTER | Age: 35
End: 2025-02-04

## 2025-02-04 VITALS
DIASTOLIC BLOOD PRESSURE: 69 MMHG | SYSTOLIC BLOOD PRESSURE: 137 MMHG | OXYGEN SATURATION: 99 % | HEART RATE: 64 BPM | TEMPERATURE: 98 F | RESPIRATION RATE: 16 BRPM

## 2025-02-04 VITALS
SYSTOLIC BLOOD PRESSURE: 127 MMHG | DIASTOLIC BLOOD PRESSURE: 71 MMHG | HEART RATE: 47 BPM | RESPIRATION RATE: 16 BRPM | TEMPERATURE: 98 F

## 2025-02-04 DIAGNOSIS — Z98.890 OTHER SPECIFIED POSTPROCEDURAL STATES: Chronic | ICD-10-CM

## 2025-02-04 PROCEDURE — 26615 TREAT METACARPAL FRACTURE: CPT | Mod: RT

## 2025-02-04 DEVICE — IMPLANTABLE DEVICE: Type: IMPLANTABLE DEVICE | Site: RIGHT | Status: FUNCTIONAL

## 2025-02-04 RX ORDER — OXYCODONE HYDROCHLORIDE 30 MG/1
5 TABLET ORAL ONCE
Refills: 0 | Status: DISCONTINUED | OUTPATIENT
Start: 2025-02-04 | End: 2025-02-04

## 2025-02-04 RX ORDER — ONDANSETRON 4 MG/1
4 TABLET, ORALLY DISINTEGRATING ORAL ONCE
Refills: 0 | Status: DISCONTINUED | OUTPATIENT
Start: 2025-02-04 | End: 2025-02-04

## 2025-02-04 RX ORDER — ACETAMINOPHEN 160 MG/5ML
650 SUSPENSION ORAL ONCE
Refills: 0 | Status: DISCONTINUED | OUTPATIENT
Start: 2025-02-04 | End: 2025-02-04

## 2025-02-04 RX ORDER — SODIUM CHLORIDE 9 G/ML
1000 INJECTION, SOLUTION INTRAVENOUS
Refills: 0 | Status: DISCONTINUED | OUTPATIENT
Start: 2025-02-04 | End: 2025-02-04

## 2025-02-04 RX ADMIN — ANTISEPTIC SURGICAL SCRUB 1 APPLICATION(S): 0.04 SOLUTION TOPICAL at 09:08

## 2025-02-04 NOTE — PRE-ANESTHESIA EVALUATION ADULT - NSANTHOSAYNRD_GEN_A_CORE
signed prescription in [facility] hold bin. Waiting to hear back from patient on delivery method.     No. MONTEZ screening performed.  STOP BANG Legend: 0-2 = LOW Risk; 3-4 = INTERMEDIATE Risk; 5-8 = HIGH Risk

## 2025-02-04 NOTE — PRE-ANESTHESIA EVALUATION ADULT - NSANTHGENDERRD_ENT_A_CORE
Barbie Ibrahim PGY-2: Podiatry did bedside reduction. Placed in surgical boot. To follow up with podiatry in one week.
Yes

## 2025-02-14 ENCOUNTER — APPOINTMENT (OUTPATIENT)
Dept: ORTHOPEDIC SURGERY | Facility: CLINIC | Age: 35
End: 2025-02-14
Payer: COMMERCIAL

## 2025-02-14 DIAGNOSIS — S62.324D DISPLACED FRACTURE OF SHAFT OF FOURTH METACARPAL BONE, RIGHT HAND, SUBSEQUENT ENCOUNTER FOR FRACTURE WITH ROUTINE HEALING: ICD-10-CM

## 2025-02-14 DIAGNOSIS — S62.322D DISPLACED FRACTURE OF SHAFT OF THIRD METACARPAL BONE, RIGHT HAND, SUBSEQUENT ENCOUNTER FOR FRACTURE WITH ROUTINE HEALING: ICD-10-CM

## 2025-02-14 PROCEDURE — 99024 POSTOP FOLLOW-UP VISIT: CPT

## 2025-02-14 PROCEDURE — 73130 X-RAY EXAM OF HAND: CPT | Mod: RT

## 2025-02-14 RX ORDER — OXYCODONE AND ACETAMINOPHEN 5; 325 MG/1; MG/1
5-325 TABLET ORAL
Qty: 15 | Refills: 0 | Status: DISCONTINUED | COMMUNITY
Start: 2025-02-03 | End: 2025-02-14

## 2025-02-14 RX ORDER — CEPHALEXIN 500 MG/1
500 CAPSULE ORAL 3 TIMES DAILY
Qty: 21 | Refills: 0 | Status: DISCONTINUED | COMMUNITY
Start: 2025-02-03 | End: 2025-02-14

## 2025-02-24 ENCOUNTER — APPOINTMENT (OUTPATIENT)
Dept: ORTHOPEDIC SURGERY | Facility: CLINIC | Age: 35
End: 2025-02-24

## 2025-03-11 ENCOUNTER — APPOINTMENT (OUTPATIENT)
Dept: ORTHOPEDIC SURGERY | Facility: CLINIC | Age: 35
End: 2025-03-11
Payer: COMMERCIAL

## 2025-03-11 DIAGNOSIS — S62.322D DISPLACED FRACTURE OF SHAFT OF THIRD METACARPAL BONE, RIGHT HAND, SUBSEQUENT ENCOUNTER FOR FRACTURE WITH ROUTINE HEALING: ICD-10-CM

## 2025-03-11 DIAGNOSIS — S62.324D DISPLACED FRACTURE OF SHAFT OF FOURTH METACARPAL BONE, RIGHT HAND, SUBSEQUENT ENCOUNTER FOR FRACTURE WITH ROUTINE HEALING: ICD-10-CM

## 2025-03-11 PROCEDURE — 73130 X-RAY EXAM OF HAND: CPT | Mod: RT

## 2025-03-11 PROCEDURE — 99024 POSTOP FOLLOW-UP VISIT: CPT

## 2025-04-22 ENCOUNTER — APPOINTMENT (OUTPATIENT)
Dept: ORTHOPEDIC SURGERY | Facility: CLINIC | Age: 35
End: 2025-04-22
Payer: COMMERCIAL

## 2025-04-22 DIAGNOSIS — S62.322D DISPLACED FRACTURE OF SHAFT OF THIRD METACARPAL BONE, RIGHT HAND, SUBSEQUENT ENCOUNTER FOR FRACTURE WITH ROUTINE HEALING: ICD-10-CM

## 2025-04-22 DIAGNOSIS — S62.324D DISPLACED FRACTURE OF SHAFT OF FOURTH METACARPAL BONE, RIGHT HAND, SUBSEQUENT ENCOUNTER FOR FRACTURE WITH ROUTINE HEALING: ICD-10-CM

## 2025-04-22 PROCEDURE — 73130 X-RAY EXAM OF HAND: CPT | Mod: RT

## 2025-04-22 PROCEDURE — 99024 POSTOP FOLLOW-UP VISIT: CPT

## 2025-05-20 RX ORDER — METHYLPREDNISOLONE 4 MG/1
4 TABLET ORAL
Qty: 1 | Refills: 1 | Status: ACTIVE | COMMUNITY
Start: 2025-05-20 | End: 1900-01-01

## 2025-05-22 ENCOUNTER — NON-APPOINTMENT (OUTPATIENT)
Age: 35
End: 2025-05-22

## (undated) DEVICE — Device

## (undated) DEVICE — SUT ETHILON 5-0 18" P-3

## (undated) DEVICE — DRILL BIT MEDARTIS 1.6X25MM

## (undated) DEVICE — GLV 8 PROTEXIS (WHITE)

## (undated) DEVICE — SUT VICRYL 4-0 18" PS-2 UNDYED

## (undated) DEVICE — VENODYNE/SCD SLEEVE CALF MEDIUM

## (undated) DEVICE — WARMING BLANKET LOWER ADULT

## (undated) DEVICE — TOURNIQUET CUFF 18" DUAL PORT SINGLE BLADDER W PLC  (BLACK)

## (undated) DEVICE — NDL HYPO SAFE 20G X 1.5" (YELLOW)

## (undated) DEVICE — SUT SILK 4-0 18" PS-2

## (undated) DEVICE — DRAPE C ARM MINI PACK FOR 6800

## (undated) DEVICE — DRSG KERLIX ROLL LG 4.5"

## (undated) DEVICE — TOURNIQUET CUFF 24" DUAL PORT SINGLE BLADDER W PLC (BLACK)

## (undated) DEVICE — MARKING PEN W RULER